# Patient Record
Sex: FEMALE | Race: WHITE | Employment: FULL TIME | URBAN - METROPOLITAN AREA
[De-identification: names, ages, dates, MRNs, and addresses within clinical notes are randomized per-mention and may not be internally consistent; named-entity substitution may affect disease eponyms.]

---

## 2018-04-09 PROCEDURE — 88305 TISSUE EXAM BY PATHOLOGIST: CPT | Performed by: INTERNAL MEDICINE

## 2019-03-26 ENCOUNTER — TELEPHONE (OUTPATIENT)
Dept: HEMATOLOGY/ONCOLOGY | Facility: HOSPITAL | Age: 64
End: 2019-03-26

## 2019-03-26 NOTE — TELEPHONE ENCOUNTER
PT being referred by Dr Connie Carlson to see Eladia L[facility]pez. [facility]  n[facility]

## 2019-04-05 ENCOUNTER — HOSPITAL ENCOUNTER (OUTPATIENT)
Dept: MAMMOGRAPHY | Age: 64
Discharge: HOME | End: 2019-04-05
Attending: OBSTETRICS & GYNECOLOGY
Payer: COMMERCIAL

## 2019-04-05 DIAGNOSIS — Z12.31 ENCOUNTER FOR SCREENING MAMMOGRAM FOR MALIGNANT NEOPLASM OF BREAST: ICD-10-CM

## 2019-04-05 PROCEDURE — 77063 BREAST TOMOSYNTHESIS BI: CPT | Performed by: OBSTETRICS & GYNECOLOGY

## 2019-04-05 PROCEDURE — 77067 SCR MAMMO BI INCL CAD: CPT | Performed by: OBSTETRICS & GYNECOLOGY

## 2019-04-25 ENCOUNTER — TELEPHONE (OUTPATIENT)
Dept: HEMATOLOGY/ONCOLOGY | Facility: HOSPITAL | Age: 64
End: 2019-04-25

## 2019-05-20 ENCOUNTER — TELEPHONE (OUTPATIENT)
Dept: HEMATOLOGY/ONCOLOGY | Facility: HOSPITAL | Age: 64
End: 2019-05-20

## 2019-05-20 NOTE — TELEPHONE ENCOUNTER
Several attempts have been made to reach patient to make appt with JO Lam.  This is the last attempt to reache her. nely

## 2019-07-03 PROCEDURE — 87086 URINE CULTURE/COLONY COUNT: CPT | Performed by: INTERNAL MEDICINE

## 2019-12-11 PROBLEM — E78.1 HYPERTRIGLYCERIDEMIA: Status: ACTIVE | Noted: 2019-12-11

## 2019-12-11 PROBLEM — E66.9 OBESITY (BMI 30-39.9): Status: ACTIVE | Noted: 2019-12-11

## 2019-12-11 PROBLEM — E88.81 METABOLIC SYNDROME: Status: ACTIVE | Noted: 2019-12-11

## 2020-01-18 PROBLEM — E88.81 INSULIN RESISTANCE: Status: ACTIVE | Noted: 2020-01-18

## 2020-11-16 PROBLEM — E78.1 HYPERTRIGLYCERIDEMIA: Status: RESOLVED | Noted: 2019-12-11 | Resolved: 2020-11-16

## 2020-11-16 PROBLEM — E88.81 INSULIN RESISTANCE: Status: RESOLVED | Noted: 2020-01-18 | Resolved: 2020-11-16

## 2021-03-22 PROBLEM — E88.81 METABOLIC SYNDROME: Status: RESOLVED | Noted: 2019-12-11 | Resolved: 2021-03-22

## 2021-03-22 PROBLEM — E66.9 OBESITY (BMI 30-39.9): Status: RESOLVED | Noted: 2019-12-11 | Resolved: 2021-03-22
